# Patient Record
Sex: FEMALE | Race: WHITE
[De-identification: names, ages, dates, MRNs, and addresses within clinical notes are randomized per-mention and may not be internally consistent; named-entity substitution may affect disease eponyms.]

---

## 2021-04-23 ENCOUNTER — HOSPITAL ENCOUNTER (OUTPATIENT)
Dept: HOSPITAL 47 - RADMAMWWP | Age: 51
Discharge: HOME | End: 2021-04-23
Attending: OBSTETRICS & GYNECOLOGY
Payer: COMMERCIAL

## 2021-04-23 DIAGNOSIS — Z12.31: Primary | ICD-10-CM

## 2021-04-23 DIAGNOSIS — Z80.3: ICD-10-CM

## 2021-04-23 PROCEDURE — 77067 SCR MAMMO BI INCL CAD: CPT

## 2021-04-23 PROCEDURE — 77063 BREAST TOMOSYNTHESIS BI: CPT

## 2021-04-26 NOTE — MM
Reason for exam: screening  (asymptomatic).

Last mammogram was performed 4 years and 8 months ago.



History:

Family history of breast cancer in paternal grandmother at age 60.

Excisional biopsy of the left breast, November 22, 2006.  Benign left mammotome 

panel of the left breast, October 27, 2006.

Took hormonal contraceptives for 3 years beginning at age 15.



Physical Findings:

A clinical breast exam by your physician is recommended on an annual basis and 

results should be correlated with mammographic findings.



MG 3D Screening Mammo W/Cad

Bilateral CC and MLO view(s) were taken.

Prior study comparison: August 31, 2016, bilateral MG 3d diag mammo w/cad [facility].  

August 14, 2015, bilateral MG diagnostic mammo w CAD [facility].

The breast tissue is heterogeneously dense. This may lower the sensitivity of 

mammography.  There are benign appearing round calcifications bilaterally. 

Previous mammotome biopsy in the left breast. There is chronic nodularity in the 

left breast. There is no discrete abnormality.





ASSESSMENT: Benign, BI-RAD 2



RECOMMENDATION:

Routine screening mammogram of both breasts in 1 year.

## 2022-05-02 ENCOUNTER — HOSPITAL ENCOUNTER (OUTPATIENT)
Dept: HOSPITAL 47 - RADMAMWWP | Age: 52
Discharge: HOME | End: 2022-05-02
Attending: OBSTETRICS & GYNECOLOGY
Payer: COMMERCIAL

## 2022-05-02 DIAGNOSIS — Z12.31: Primary | ICD-10-CM

## 2022-05-02 DIAGNOSIS — Z80.3: ICD-10-CM

## 2022-05-02 PROCEDURE — 77063 BREAST TOMOSYNTHESIS BI: CPT

## 2022-05-02 PROCEDURE — 77067 SCR MAMMO BI INCL CAD: CPT

## 2022-05-03 NOTE — MM
Reason for exam: screening  (asymptomatic).

Last mammogram was performed 1 year ago.



History:

Family history of breast cancer in paternal grandmother at age 60.

Excisional biopsy of the left breast, November 22, 2006.  Benign left mammotome 

panel of the left breast, October 27, 2006.

Took hormonal contraceptives for 3 years beginning at age 15.



Physical Findings:

A clinical breast exam by your physician is recommended on an annual basis and 

results should be correlated with mammographic findings.



MG 3D Screening Mammo W/Cad

Bilateral CC and MLO view(s) were taken.  XCCL view(s) were taken of the right 

breast.

Prior study comparison: April 23, 2021, bilateral MG 3d screening mammo w/cad.  

August 31, 2016, bilateral MG 3d diag mammo w/cad [facility].

The breast tissue is heterogeneously dense. This may lower the sensitivity of 

mammography.

Finding: There is a 5 mm obscured oval mass in the lower quadrant, anterior, 

middle position of the right breast, not clearly seen on priors. Previous 

mammotome biopsy in the left breast. There is a chronic nodularity bilaterally 

axillary region.





ASSESSMENT: Incomplete: need additional imaging evaluation, BI-RAD 0



RECOMMENDATION:

Ultrasound of the right breast.



[facility] will attempt to contact patient  to return for ultrasound.

## 2022-05-04 ENCOUNTER — HOSPITAL ENCOUNTER (OUTPATIENT)
Dept: HOSPITAL 47 - RADUSWWP | Age: 52
Discharge: HOME | End: 2022-05-04
Attending: OBSTETRICS & GYNECOLOGY
Payer: COMMERCIAL

## 2022-05-04 DIAGNOSIS — Z80.3: ICD-10-CM

## 2022-05-04 DIAGNOSIS — N60.01: ICD-10-CM

## 2022-05-04 DIAGNOSIS — N60.41: Primary | ICD-10-CM

## 2022-05-04 NOTE — USB
Reason for exam: additional evaluation requested from abnormal screening.



History:

Family history of breast cancer in paternal grandmother at age 60.

Excisional biopsy of the left breast, November 22, 2006.  Benign left mammotome 

panel of the left breast, October 27, 2006.

Took hormonal contraceptives for 3 years beginning at age 15.



Physical Findings:

A clinical breast exam by your physician is recommended on an annual basis and 

results should be correlated with mammographic findings.



US Breast Workup Limited RT

Technologist: Emelia Gusman

Right limited breast ultrasound including focal area of concern, retroareolar and 

axilla demonstrates a 0.3 x 0.5 x 0.3cm oval, circumscribed, cystic lesion at 6 

o'clock, 3cm from nipple and duct ectasia at the nipple.



Results were given to the patient verbally at the time of the exam.





ASSESSMENT: Benign, BI-RAD 2



RECOMMENDATION:

Return to routine screening mammogram schedule for both breasts.

## 2023-04-17 ENCOUNTER — HOSPITAL ENCOUNTER (OUTPATIENT)
Dept: HOSPITAL 47 - RADMAMWWP | Age: 53
Discharge: HOME | End: 2023-04-17
Attending: OBSTETRICS & GYNECOLOGY
Payer: COMMERCIAL

## 2023-04-17 DIAGNOSIS — Z80.3: ICD-10-CM

## 2023-04-17 DIAGNOSIS — N64.4: Primary | ICD-10-CM

## 2023-04-17 DIAGNOSIS — Z78.0: ICD-10-CM

## 2023-04-17 PROCEDURE — 77066 DX MAMMO INCL CAD BI: CPT

## 2023-04-17 PROCEDURE — 77062 BREAST TOMOSYNTHESIS BI: CPT

## 2023-04-17 NOTE — MM
Reason for Exam: Clinical finding. 

Last screening mammogram was performed 11 month(s) ago.

Indicated Problems: 

Lump or thickening of the left side.





Patient History: 

Menarche at age 12. First Full-Term Pregnancy at age 24. Postmenopausal. Hormonal Contraceptives for

3 years from age 15 until age 18. 11/22/2006, Excisional Biopsy on the Left side. 10/27/2006, Benign

Core Biopsy on the left side.

Paternal grandmother had breast cancer, age 60. 





Risk Values: 

[place] 5 year model risk: 1.9%.

NCI Lifetime model risk: 13.6%.





Prior Study Comparison: 

5/30/2014 Bilateral Diagnostic Mammogram, [facility]. 12/30/2014 Left Diagnostic Mammogram, [facility]. 8/14/2015

Bilateral Diagnostic Mammogram, [facility]. 8/31/2016 Bilateral Diagnostic Mammogram, [facility]. 4/23/2021

Bilateral Screening Mammogram, [facility]. 5/2/2022 Bilateral Screening Mammogram, [facility]. 





Tissue Density: 

The breast tissue is heterogeneously dense. This may lower the sensitivity of mammography.





Findings: 

Analyzed By CAD. 

No discrete abnormality seen at the site of clinical concern left 9:00 position. Ultrasound is

recommended. Previous biopsy upper outer left breast. No suspicious calcifications are present. 





Overall Assessment: Incomplete: need additional imaging evaluation, BI-RAD 0





Management: 

Diagnostic Breast Ultrasound of the left breast.

A clinical breast exam by your physician is recommended on an annual basis and results should be

correlated with mammographic findings.  This exam should not preclude additional follow-up of

suspicious palpable abnormalities.  Results were given to the patient verbally at the time of exam.



Electronically signed and approved by: Leopold M. Fregoli, M.D. Radiologis

## 2023-04-17 NOTE — USB
Reason for Exam: Clinical finding. 





Patient History: 

Menarche at age 12. First Full-Term Pregnancy at age 24. Postmenopausal. Hormonal Contraceptives for

3 years from age 15 until age 18. 11/22/2006, Excisional Biopsy on the Left side. 10/27/2006, Benign

Core Biopsy on the left side.

Paternal grandmother had breast cancer, age 60. 





Risk Values: 

[place] 5 year model risk: 1.9%.

NCI Lifetime model risk: 13.6%.

Technique: 

Method: Targeted.  





Prior Study Comparison: 

8/31/2016 Bilateral Diagnostic Mammogram, [facility]. 4/23/2021 Bilateral Screening Mammogram, [facility].

5/2/2022 Bilateral Screening Mammogram, [facility]. 





Findings: 

The area of palpable concern of the left breast, the axilla of the left breast and the retroareolar

of the left breast were scanned.

Irregular hypoechoic lesion is 5 cm from the nipple at the site of clinical concern could reflect

scar however tissue diagnosis is recommended. Area of concern measures 1.2 x 0.9 cm.

Normal-appearing lymph node identified. 





Overall Assessment: Suspicious, BI-RAD 4





Management: 

Ultrasound Core Biopsy of the left breast.

A clinical breast exam by your physician is recommended on an annual basis and results should be

correlated with mammographic findings.  This exam should not preclude additional follow-up of

suspicious palpable abnormalities.  Results were given to the patient verbally at the time of exam.



Electronically signed and approved by: Leopold M. Fregoli, M.D. Radiologis

## 2023-10-27 ENCOUNTER — HOSPITAL ENCOUNTER (OUTPATIENT)
Dept: HOSPITAL 47 - RADUSWWP | Age: 53
Discharge: HOME | End: 2023-10-27
Attending: OBSTETRICS & GYNECOLOGY
Payer: COMMERCIAL

## 2023-10-27 DIAGNOSIS — Z78.0: ICD-10-CM

## 2023-10-27 DIAGNOSIS — N63.20: Primary | ICD-10-CM

## 2023-10-27 DIAGNOSIS — Z80.3: ICD-10-CM

## 2023-10-27 NOTE — USB
Reason for Exam: Follow-up at short interval from prior study. 





Patient History: 

Menarche at age 12. First Full-Term Pregnancy at age 24. Postmenopausal. Hormonal Contraceptives for

3 years from age 15 until age 18. 11/22/2006, Excisional Biopsy on the Left side. 10/27/2006, Benign

Core Biopsy on the left side. 05/03/2023, US discontinued breast bx LT on the left side.

Paternal grandmother had breast cancer, age 60. 





Risk Values: 

[place] 5 year model risk: 1.9%.

NCI Lifetime model risk: 13.6%.

Technique: 

Method: Targeted.  





Prior Study Comparison: 

4/23/2021 Bilateral Screening Mammogram, [facility]. 5/2/2022 Bilateral Screening Mammogram, [facility]. 4/17/2023

Bilateral MG 3D diag mammo w/cad [facility], [facility]. 4/17/2023 Left US breast limited LT, [facility]. 





Findings: 

The lateral section of the breast of the left breast, the axilla of the left breast and the

retroareolar of the left breast were scanned.

Targeted ultrasound left breast from 2 to 3:00 position evaluation the nipple and axillary tail was

performed. There is an irregular hypoechoic lesion within the left breast at site of scarring

measuring 0.9 x 0.9 x 0.9 cm. This demonstrates antiparallel appearance. No internal color flow

identified. Overall this is similar to prior ultrasound on 4/17/2023. This was less apparent on

discontinued biopsy 5/3/2023. Lack of change suggests post excisional scarring. 





Overall Assessment: Probably benign, BI-RAD 3





Management: 

Diagnostic Mammogram of both breasts in 6 months.

Diagnostic Breast Ultrasound of the left breast in 6 months.

A clinical breast exam by your physician is recommended on an annual basis and results should be

correlated with mammographic findings.  This exam should not preclude additional follow-up of

suspicious palpable abnormalities.  Results were given to the patient verbally at the time of exam.



Electronically signed and approved by: Oscar Kwong D.O.

## 2024-05-24 ENCOUNTER — HOSPITAL ENCOUNTER (OUTPATIENT)
Dept: HOSPITAL 47 - RADMAMWWP | Age: 54
Discharge: HOME | End: 2024-05-24
Attending: OBSTETRICS & GYNECOLOGY
Payer: COMMERCIAL

## 2024-05-24 DIAGNOSIS — Z53.9: Primary | ICD-10-CM

## 2024-05-24 PROCEDURE — 77066 DX MAMMO INCL CAD BI: CPT

## 2024-05-24 PROCEDURE — 77062 BREAST TOMOSYNTHESIS BI: CPT

## 2024-05-24 NOTE — MM
Reason for Exam: Follow-up at short interval from prior study. 

Last mammogram was performed 1 year(s) and 1 month(s) ago. 





Patient History: 

Menarche at age 12. First Full-Term Pregnancy at age 24. Postmenopausal. Hormonal Contraceptives for

3 years from age 15 until age 18. 11/22/2006, Excisional Biopsy on the Left side. 10/27/2006, Benign

Core Biopsy on the left side. 05/03/2023, US discontinued breast bx LT on the left side.

Paternal grandmother had breast cancer, age 60. 





Risk Values: 

[place] 5 year model risk: 2.0%.

NCI Lifetime model risk: 13.3%.





Prior Study Comparison: 

8/14/2015 Bilateral Diagnostic Mammogram, [facility]. 4/23/2021 Bilateral Screening Mammogram, [facility].

5/2/2022 Bilateral Screening Mammogram, [facility]. 4/17/2023 Bilateral MG 3D diag mammo w/cad [facility], [facility].

10/27/2023 Left US breast limited LT, [facility]. 





Tissue Density: 

The breasts are heterogeneously dense, which may obscure small masses.





Findings: 

Analyzed By CAD. 

The pattern is symmetrical.

Pattern is stable. The core marker is within the left breast. Nodularity is present bilaterally. No

significant interval changes are evident

No suspicious groups of microcalcifications, spiculated or lobular masses, architectural distortion

or other secondary signs of malignancy are mammographically apparent. 





Overall Assessment: Benign, BI-RAD 2





Management: 

Screening Mammogram of both breasts in 1 year.

A negative mammogram report should not preclude additional follow up of suspicious palpable

abnormalities.

Patient should continue monthly self breast exam.

A clinical breast exam by your physician is recommended on an annual basis and results should be

correlated with mammographic findings.



Note on [place] scores and lifetime risk:

1. A [place] score greater than 3% is considered moderate risk. If this is the case, consider

specialist referral to assess eligibility for a risk reducing agent.

2. If overall lifetime risk for the development of breast cancer is 20% or higher, the patient may

qualify for future screening with alternating mammogram and breast MRI.



Electronically signed and approved by: Gagan Garcia D.O. Radiologis